# Patient Record
Sex: FEMALE | NOT HISPANIC OR LATINO | Employment: UNEMPLOYED | URBAN - METROPOLITAN AREA
[De-identification: names, ages, dates, MRNs, and addresses within clinical notes are randomized per-mention and may not be internally consistent; named-entity substitution may affect disease eponyms.]

---

## 2023-05-04 ENCOUNTER — HOSPITAL ENCOUNTER (OUTPATIENT)
Dept: DATA CONVERSION | Facility: HOSPITAL | Age: 43
End: 2023-05-04

## 2023-11-01 ENCOUNTER — HOSPITAL ENCOUNTER (EMERGENCY)
Facility: HOSPITAL | Age: 43
Discharge: OTHER INSTITUTION | End: 2023-11-04
Attending: EMERGENCY MEDICINE
Payer: COMMERCIAL

## 2023-11-01 DIAGNOSIS — R45.851 SUICIDAL IDEATION: ICD-10-CM

## 2023-11-01 DIAGNOSIS — F29 PSYCHOSIS, UNSPECIFIED PSYCHOSIS TYPE (MULTI): Primary | ICD-10-CM

## 2023-11-01 DIAGNOSIS — F10.920 ALCOHOLIC INTOXICATION WITHOUT COMPLICATION (CMS-HCC): ICD-10-CM

## 2023-11-01 PROCEDURE — 85025 COMPLETE CBC W/AUTO DIFF WBC: CPT | Performed by: STUDENT IN AN ORGANIZED HEALTH CARE EDUCATION/TRAINING PROGRAM

## 2023-11-01 PROCEDURE — 80329 ANALGESICS NON-OPIOID 1 OR 2: CPT | Mod: MUE | Performed by: STUDENT IN AN ORGANIZED HEALTH CARE EDUCATION/TRAINING PROGRAM

## 2023-11-01 PROCEDURE — 80053 COMPREHEN METABOLIC PANEL: CPT | Performed by: STUDENT IN AN ORGANIZED HEALTH CARE EDUCATION/TRAINING PROGRAM

## 2023-11-01 PROCEDURE — 36415 COLL VENOUS BLD VENIPUNCTURE: CPT | Performed by: STUDENT IN AN ORGANIZED HEALTH CARE EDUCATION/TRAINING PROGRAM

## 2023-11-01 PROCEDURE — 81025 URINE PREGNANCY TEST: CPT | Performed by: STUDENT IN AN ORGANIZED HEALTH CARE EDUCATION/TRAINING PROGRAM

## 2023-11-01 PROCEDURE — 82550 ASSAY OF CK (CPK): CPT | Performed by: EMERGENCY MEDICINE

## 2023-11-01 PROCEDURE — 99285 EMERGENCY DEPT VISIT HI MDM: CPT | Performed by: EMERGENCY MEDICINE

## 2023-11-01 RX ORDER — FENTANYL CITRATE 50 UG/ML
INJECTION, SOLUTION INTRAMUSCULAR; INTRAVENOUS
Status: DISPENSED
Start: 2023-11-01 | End: 2023-11-02

## 2023-11-01 ASSESSMENT — LIFESTYLE VARIABLES
HAVE PEOPLE ANNOYED YOU BY CRITICIZING YOUR DRINKING: NO
EVER FELT BAD OR GUILTY ABOUT YOUR DRINKING: NO
REASON UNABLE TO ASSESS: NO
EVER HAD A DRINK FIRST THING IN THE MORNING TO STEADY YOUR NERVES TO GET RID OF A HANGOVER: NO
HAVE YOU EVER FELT YOU SHOULD CUT DOWN ON YOUR DRINKING: NO

## 2023-11-01 ASSESSMENT — COLUMBIA-SUICIDE SEVERITY RATING SCALE - C-SSRS
6. HAVE YOU EVER DONE ANYTHING, STARTED TO DO ANYTHING, OR PREPARED TO DO ANYTHING TO END YOUR LIFE?: NO
2. HAVE YOU ACTUALLY HAD ANY THOUGHTS OF KILLING YOURSELF?: NO
1. IN THE PAST MONTH, HAVE YOU WISHED YOU WERE DEAD OR WISHED YOU COULD GO TO SLEEP AND NOT WAKE UP?: NO

## 2023-11-01 ASSESSMENT — PAIN SCALES - GENERAL: PAINLEVEL_OUTOF10: 0 - NO PAIN

## 2023-11-01 ASSESSMENT — PAIN - FUNCTIONAL ASSESSMENT: PAIN_FUNCTIONAL_ASSESSMENT: 0-10

## 2023-11-01 NOTE — ED TRIAGE NOTES
Patient presents to the Emergency department with a chief complaint of alcohol intoxication. Per ECFD, patient was found on the ground stating she was drunk. Patient initially refused treatment and then proceeded to call 911 after they left. Patient became uncooperative with ECFD. Upon arrival to [facility] ED, patient endorses drinking an unknown amount of alcohol prior to arrival. Patient denies any medical complaints at this time. Patient has no outward signs of trauma. Patient requesting food and a ginger ale upon arrival. Patient denies any suicidal ideation, homicidal ideation, but says she might be hearing voices. Patient unwilling to answer any further questions.

## 2023-11-01 NOTE — ED PROVIDER NOTES
CC: Alcohol Intoxication     HPI:  This is a 43-year-old female presenting to the ED via PD for intoxication.  Patient was found sitting on the ground stating she was drunk.  Per report patient initially refused treatment and then proceeded to call 911 after they left.  States that she has been drinking all day.  Denies any other substance use.  Denies any trauma.  States she thinks she has a UTI.      Limitations to History: Intoxication    Additional History Obtained from: EMS    Records Reviewed:  Recent available ED and inpatient notes reviewed in EMR.    PMHx/PSHx:  Per HPI.   - has no past medical history on file.  - has no past surgical history on file.    Medications:  Reviewed in EMR. See EMR for complete list of medications and doses.    Allergies:  Patient has no allergy information on record.    Social History:  - Tobacco:  has no history on file for tobacco use.   - Alcohol:  has no history on file for alcohol use.   - Illicit Drugs:  has no history on file for drug use.     ROS:  Per HPI.     ???????????????????????????????????????????????????????????????  Triage Vitals:  T 36.7 °C (98.1 °F)  HR 88  BP (!) 168/97  RR 16  O2 99 %      Physical Exam  Vitals and nursing note reviewed.   Constitutional:       General: She is not in acute distress.     Appearance: She is not toxic-appearing.   HENT:      Head: Normocephalic and atraumatic.      Mouth/Throat:      Mouth: Mucous membranes are moist.   Eyes:      General: No scleral icterus.     Conjunctiva/sclera: Conjunctivae normal.   Cardiovascular:      Rate and Rhythm: Normal rate and regular rhythm.   Pulmonary:      Effort: Pulmonary effort is normal. No respiratory distress.   Abdominal:      General: Abdomen is flat.      Palpations: Abdomen is soft.   Skin:     General: Skin is warm.   Neurological:      General: No focal deficit present.      Mental Status: She is alert and oriented to person, place, and time.   Psychiatric:         Mood and  Affect: Mood normal.      Comments: Intermittently non-cooperative     :  ???????????????????????????????????????????????????????????????      ED Labs/Imaging:   Labs Reviewed   URINALYSIS WITH REFLEX MICROSCOPIC AND CULTURE   POCT PREGNANCY, URINE   POCT GLUCOSE METER     No orders to display         ED Course & MDM   ED Course as of 11/03/23 1813   Thu Nov 02, 2023   0723 Patient is medically clear at 0 723. [BN]   Fri Nov 03, 2023   1132 Patient with urinary frequency, nitrite + and small LE; will treat as UTI given symptoms [JV]      ED Course User Index  [BN] Missael Rogers MD  [JV] Cristóbal Wright MD         Diagnoses as of 11/03/23 1813   Psychosis, unspecified psychosis type (CMS/HCC)   Alcoholic intoxication without complication (CMS/HCC)   Suicidal ideation       Medical Decision Making  This is a 43-year-old female presenting to the ED for intoxication.  Patient arrives hemodynamically stable and not in acute distress.  Patient intermittently cooperative but is conversing and has no focal deficits.  Urinalysis obtained as well as urine pregnancy test given patient's complaints that she think she has a UTI.  On reevaluation patient stating that she actually drinks to calm the voices she has been having in her head.  Given this psychiatric work-up including basic labs were initiated and EPAT was consulted.  Patient will be handed off to oncoming team pending urinalysis results and EPAT evaluation and final disposition.      Social Determinants Limiting Care:  None identified    Disposition:  Handoff, final dispo pending    Patient seen and discussed with attending physician.    Glenys Gray MD PGY3  Emergency Medicine      Procedures ? SmartLinks last updated 11/1/2023 7:16 PM          Glenys Gray MD  Resident  11/03/23 2780

## 2023-11-02 LAB
ALBUMIN SERPL BCP-MCNC: 3.5 G/DL (ref 3.4–5)
ALP SERPL-CCNC: 98 U/L (ref 33–110)
ALT SERPL W P-5'-P-CCNC: 26 U/L (ref 7–45)
AMPHETAMINES UR QL SCN: ABNORMAL
ANION GAP SERPL CALC-SCNC: 15 MMOL/L (ref 10–20)
APAP SERPL-MCNC: <10 UG/ML
APPEARANCE UR: ABNORMAL
AST SERPL W P-5'-P-CCNC: 34 U/L (ref 9–39)
BARBITURATES UR QL SCN: ABNORMAL
BASOPHILS # BLD AUTO: 0.05 X10*3/UL (ref 0–0.1)
BASOPHILS NFR BLD AUTO: 0.5 %
BENZODIAZ UR QL SCN: ABNORMAL
BILIRUB SERPL-MCNC: 0.3 MG/DL (ref 0–1.2)
BILIRUB UR STRIP.AUTO-MCNC: NEGATIVE MG/DL
BUN SERPL-MCNC: 4 MG/DL (ref 6–23)
BZE UR QL SCN: ABNORMAL
CALCIUM SERPL-MCNC: 9.2 MG/DL (ref 8.6–10.6)
CANNABINOIDS UR QL SCN: ABNORMAL
CHLORIDE SERPL-SCNC: 107 MMOL/L (ref 98–107)
CK SERPL-CCNC: <10 U/L (ref 0–215)
CO2 SERPL-SCNC: 25 MMOL/L (ref 21–32)
COLOR UR: YELLOW
CREAT SERPL-MCNC: 0.53 MG/DL (ref 0.5–1.05)
EOSINOPHIL # BLD AUTO: 0.02 X10*3/UL (ref 0–0.7)
EOSINOPHIL NFR BLD AUTO: 0.2 %
ERYTHROCYTE [DISTWIDTH] IN BLOOD BY AUTOMATED COUNT: 17 % (ref 11.5–14.5)
ETHANOL SERPL-MCNC: 10 MG/DL
FENTANYL+NORFENTANYL UR QL SCN: ABNORMAL
GFR SERPL CREATININE-BSD FRML MDRD: >90 ML/MIN/1.73M*2
GLUCOSE SERPL-MCNC: 122 MG/DL (ref 74–99)
GLUCOSE UR STRIP.AUTO-MCNC: NEGATIVE MG/DL
HCT VFR BLD AUTO: 35.4 % (ref 36–46)
HGB BLD-MCNC: 12.6 G/DL (ref 12–16)
HOLD SPECIMEN: NORMAL
IMM GRANULOCYTES # BLD AUTO: 0.05 X10*3/UL (ref 0–0.7)
IMM GRANULOCYTES NFR BLD AUTO: 0.5 % (ref 0–0.9)
KETONES UR STRIP.AUTO-MCNC: ABNORMAL MG/DL
LEUKOCYTE ESTERASE UR QL STRIP.AUTO: ABNORMAL
LYMPHOCYTES # BLD AUTO: 1.85 X10*3/UL (ref 1.2–4.8)
LYMPHOCYTES NFR BLD AUTO: 17.1 %
MCH RBC QN AUTO: 32.7 PG (ref 26–34)
MCHC RBC AUTO-ENTMCNC: 35.6 G/DL (ref 32–36)
MCV RBC AUTO: 92 FL (ref 80–100)
MONOCYTES # BLD AUTO: 1.04 X10*3/UL (ref 0.1–1)
MONOCYTES NFR BLD AUTO: 9.6 %
MUCOUS THREADS #/AREA URNS AUTO: NORMAL /LPF
NEUTROPHILS # BLD AUTO: 7.79 X10*3/UL (ref 1.2–7.7)
NEUTROPHILS NFR BLD AUTO: 72.1 %
NITRITE UR QL STRIP.AUTO: POSITIVE
NRBC BLD-RTO: 0 /100 WBCS (ref 0–0)
OPIATES UR QL SCN: ABNORMAL
OXYCODONE+OXYMORPHONE UR QL SCN: ABNORMAL
PCP UR QL SCN: ABNORMAL
PH UR STRIP.AUTO: 6 [PH]
PLATELET # BLD AUTO: 283 X10*3/UL (ref 150–450)
POTASSIUM SERPL-SCNC: 3.8 MMOL/L (ref 3.5–5.3)
PREGNANCY TEST URINE, POC: NEGATIVE
PROT SERPL-MCNC: 6.9 G/DL (ref 6.4–8.2)
PROT UR STRIP.AUTO-MCNC: NEGATIVE MG/DL
RBC # BLD AUTO: 3.85 X10*6/UL (ref 4–5.2)
RBC # UR STRIP.AUTO: ABNORMAL /UL
RBC #/AREA URNS AUTO: NORMAL /HPF
SALICYLATES SERPL-MCNC: <3 MG/DL
SARS-COV-2 RNA RESP QL NAA+PROBE: NOT DETECTED
SODIUM SERPL-SCNC: 143 MMOL/L (ref 136–145)
SP GR UR STRIP.AUTO: 1.01
SQUAMOUS #/AREA URNS AUTO: NORMAL /HPF
UROBILINOGEN UR STRIP.AUTO-MCNC: 2 MG/DL
WBC # BLD AUTO: 10.8 X10*3/UL (ref 4.4–11.3)
WBC #/AREA URNS AUTO: NORMAL /HPF

## 2023-11-02 PROCEDURE — 87086 URINE CULTURE/COLONY COUNT: CPT | Performed by: STUDENT IN AN ORGANIZED HEALTH CARE EDUCATION/TRAINING PROGRAM

## 2023-11-02 PROCEDURE — 80307 DRUG TEST PRSMV CHEM ANLYZR: CPT | Performed by: STUDENT IN AN ORGANIZED HEALTH CARE EDUCATION/TRAINING PROGRAM

## 2023-11-02 PROCEDURE — 81001 URINALYSIS AUTO W/SCOPE: CPT | Mod: CCI | Performed by: STUDENT IN AN ORGANIZED HEALTH CARE EDUCATION/TRAINING PROGRAM

## 2023-11-02 PROCEDURE — 87635 SARS-COV-2 COVID-19 AMP PRB: CPT | Performed by: EMERGENCY MEDICINE

## 2023-11-02 RX ORDER — NAPROXEN SODIUM 220 MG/1
324 TABLET, FILM COATED ORAL ONCE
Status: DISCONTINUED | OUTPATIENT
Start: 2023-11-02 | End: 2023-11-02

## 2023-11-02 SDOH — ECONOMIC STABILITY: HOUSING INSECURITY: FEELS SAFE LIVING IN HOME: NO

## 2023-11-02 SDOH — HEALTH STABILITY: MENTAL HEALTH: DEPRESSION SYMPTOMS: CRYING;FEELINGS OF HELPLESSNESS;FEELINGS OF HOPELESSESS

## 2023-11-02 SDOH — HEALTH STABILITY: MENTAL HEALTH: NON-SPECIFIC ACTIVE SUICIDAL THOUGHTS (PAST 1 MONTH): NO

## 2023-11-02 SDOH — HEALTH STABILITY: MENTAL HEALTH: SUICIDAL BEHAVIOR (LIFETIME): NO

## 2023-11-02 SDOH — HEALTH STABILITY: MENTAL HEALTH: ARE YOU HAVING THOUGHTS OF KILLING YOURSELF RIGHT NOW?: NO

## 2023-11-02 SDOH — HEALTH STABILITY: MENTAL HEALTH: BEHAVIORS/MOOD: SLEEPING

## 2023-11-02 SDOH — HEALTH STABILITY: MENTAL HEALTH: IN THE PAST FEW WEEKS, HAVE YOU WISHED YOU WERE DEAD?: YES

## 2023-11-02 SDOH — HEALTH STABILITY: MENTAL HEALTH: WISH TO BE DEAD (PAST 1 MONTH): YES

## 2023-11-02 SDOH — HEALTH STABILITY: MENTAL HEALTH: HAVE YOU EVER TRIED TO KILL YOURSELF?: NO

## 2023-11-02 SDOH — HEALTH STABILITY: MENTAL HEALTH: IN THE PAST WEEK, HAVE YOU BEEN HAVING THOUGHTS ABOUT KILLING YOURSELF?: NO

## 2023-11-02 SDOH — HEALTH STABILITY: MENTAL HEALTH: IN THE PAST FEW WEEKS, HAVE YOU FELT THAT YOU OR YOUR FAMILY WOULD BE BETTER OFF IF YOU WERE DEAD?: YES

## 2023-11-02 SDOH — HEALTH STABILITY: MENTAL HEALTH: ANXIETY SYMPTOMS: GENERALIZED

## 2023-11-02 ASSESSMENT — LIFESTYLE VARIABLES
TREMOR: NO TREMOR
NAUSEA AND VOMITING: NO NAUSEA AND NO VOMITING
PRESCIPTION_ABUSE_PAST_12_MONTHS: NO
SUBSTANCE_ABUSE_PAST_12_MONTHS: YES
ANXIETY: NO ANXIETY, AT EASE
HEADACHE, FULLNESS IN HEAD: NOT PRESENT
ORIENTATION AND CLOUDING OF SENSORIUM: ORIENTED AND CAN DO SERIAL ADDITIONS
TOTAL SCORE: 0
PAROXYSMAL SWEATS: NO SWEAT VISIBLE
AGITATION: NORMAL ACTIVITY
VISUAL DISTURBANCES: NOT PRESENT
AUDITORY DISTURBANCES: NOT PRESENT
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
## 2023-11-02 NOTE — PROGRESS NOTES
Patient signed out to me pending psychiatric evaluation.  Patient was medically clear at the time of signout.  While under my care psychiatric assessment was completed and was determined the patient required inpatient psychiatric admission for hallucinations and psychotic behavior.  Patient did not have any SI/HI on evaluation.  No additional medical concerns at this time.  Patient signed out in stable condition pending placement by EPAT.    Patient seen and discussed with attending physician    Missael Rogers M.D.  PGY-3 EM

## 2023-11-03 PROBLEM — R45.851 SUICIDAL IDEATION: Status: ACTIVE | Noted: 2023-11-03

## 2023-11-03 LAB — PREGNANCY TEST URINE, POC: NEGATIVE

## 2023-11-03 PROCEDURE — 99222 1ST HOSP IP/OBS MODERATE 55: CPT

## 2023-11-03 PROCEDURE — 2500000001 HC RX 250 WO HCPCS SELF ADMINISTERED DRUGS (ALT 637 FOR MEDICARE OP): Mod: SE | Performed by: STUDENT IN AN ORGANIZED HEALTH CARE EDUCATION/TRAINING PROGRAM

## 2023-11-03 RX ORDER — CEPHALEXIN 250 MG/1
500 CAPSULE ORAL ONCE
Status: DISCONTINUED | OUTPATIENT
Start: 2023-11-03 | End: 2023-11-03

## 2023-11-03 RX ORDER — CEPHALEXIN 250 MG/1
500 CAPSULE ORAL EVERY 6 HOURS SCHEDULED
Status: DISCONTINUED | OUTPATIENT
Start: 2023-11-03 | End: 2023-11-04 | Stop reason: HOSPADM

## 2023-11-03 RX ADMIN — CEPHALEXIN 500 MG: 250 CAPSULE ORAL at 18:29

## 2023-11-03 ASSESSMENT — LIFESTYLE VARIABLES
ANXIETY: NO ANXIETY, AT EASE
TREMOR: NO TREMOR
VISUAL DISTURBANCES: NOT PRESENT
AGITATION: NORMAL ACTIVITY
AUDITORY DISTURBANCES: NOT PRESENT
HEADACHE, FULLNESS IN HEAD: NOT PRESENT
PAROXYSMAL SWEATS: NO SWEAT VISIBLE
NAUSEA AND VOMITING: NO NAUSEA AND NO VOMITING
TOTAL SCORE: 0
ORIENTATION AND CLOUDING OF SENSORIUM: ORIENTED AND CAN DO SERIAL ADDITIONS

## 2023-11-03 ASSESSMENT — PAIN SCALES - GENERAL
PAINLEVEL_OUTOF10: 0 - NO PAIN
PAINLEVEL_OUTOF10: 0 - NO PAIN

## 2023-11-03 ASSESSMENT — COLUMBIA-SUICIDE SEVERITY RATING SCALE - C-SSRS
1. SINCE LAST CONTACT, HAVE YOU WISHED YOU WERE DEAD OR WISHED YOU COULD GO TO SLEEP AND NOT WAKE UP?: NO
6. HAVE YOU EVER DONE ANYTHING, STARTED TO DO ANYTHING, OR PREPARED TO DO ANYTHING TO END YOUR LIFE?: NO
2. HAVE YOU ACTUALLY HAD ANY THOUGHTS OF KILLING YOURSELF?: NO

## 2023-11-03 NOTE — PROGRESS NOTES
Patient is a 43-year-old female with initial concern for alcohol intoxication, upon metabolization of alcohol provider was concerned for AVH and suicidal ideation.  Evaluated by EPAT after medically cleared, who recommended inpatient admission for acute crisis stabilization.  Signed out to me pending bed placement.  EPAT was able to place the patient at [facility] with Dr. Santiago, all paperwork was completed, patient would benefit from inpatient admission, no decompensation or anxiolysis needed during my time caring for the patient, admitted in stable condition.

## 2023-11-03 NOTE — PROGRESS NOTES
Patient was handed off to me by Dr. Rogers at 0700. For full history, physical, and prior ED course, please see previous provider note prior to patient handoff. This is an addendum to the record.     HOSPITAL COURSE/MEDICAL DECISION MAKING  In short, this is a 43-year-old female presenting to the emergency department for acute alcohol intoxication.  Once patient had achieved sobriety, she was reporting SI.  EPAT was involved who determined that placement will be beneficial to the patient.  Signed out to me by previous provider pending location for EPAT placement. Throughout the ED stay, the patient was monitored and re-examined for any changes in stability or symptomatology.     ED Course as of 11/03/23 1100   Thu Nov 02, 2023   0723 Patient is medically clear at 0 723. [BN]      ED Course User Index  [BN] Missael Rogers MD         Diagnoses as of 11/03/23 1100   Psychosis, unspecified psychosis type (CMS/HCC)   Alcoholic intoxication without complication (CMS/HCC)         DIAGNOSIS  1.  SI    DISPOSITION  EPAT to place     I reviewed the patient´s case with Dr. Oviedo who also saw the patient and agrees with the plan. The diagnosis and plan of care was also discussed with the patient. All the patient's questions were answered. The patient was receptive and agreeable to the plan of care.     Cristóbal Wright MD  Emergency Medicine PGY-3    This note was dictated using dragon dictation.  Please excuse any errors found in the note.

## 2023-11-03 NOTE — SIGNIFICANT EVENT
Application for Emergency Admission      Ready for Transfer?  Is the patient medically cleared for transfer to inpatient psychiatry: Yes  Has the patient been accepted to an inpatient psychiatric hospital: Yes    Application for Emergency Admission  IN ACCORDANCE WITH [place]122[place]10 [place]  The Chief Clinical Officer of: [facility] 11/3/2023 .5:03 PM    Reason for Hospitalization  The undersigned has reason to believe that: Mary Kate Arias Is a mentally ill person subject to hospitalization by court order under division B Section 5122.01 of the Revised Code, i.e., this person:    1.Yes  Represents a substantial risk of physical harm to self as manifested by evidence of threats of, or attempts at, suicide or serious self-inflicted bodily harm    2.No Represents a substantial risk of physical harm to others as manifested by evidence of recent homicidal or other violent behavior, evidence of recent threats that place another in reasonable fear of violent behavior and serious physical harm, or other evidence of present dangerousness    3.Yes Represents a substantial and immediate risk of serious physical impairment or injury to self as manifested by  evidence that the person is unable to provide for and is not providing for the person's basic physical needs because of the person's mental illness and that appropriate provision for those needs cannot be made  immediately available in the community    4.Yes Would benefit from treatment in a hospital for his mental illness and is in need of such treatment as manifested by evidence of behavior that creates a grave and imminent risk to substantial rights of others or  himself.    5.Yes Would benefit from treatment as manifested by evidence of behavior that indicates all of the following:       (a) The person is unlikely to survive safely in the community without supervision, based on a clinical determination.       (b) The person has a history of lack of  compliance with treatment for mental illness and one of the following applies:      (i) At least twice within the thirty-six months prior to the filing of an affidavit seeking court-ordered treatment of the person under section 5122.111 of the Revised Code, the lack of compliance has been a significant factor in necessitating hospitalization in a hospital or receipt of services in a forensic or other mental health unit of a correctional facility, provided that the thirty-six-month period shall be extended by the length of any hospitalization or incarceration of the person that occurred within the thirty-six-month period.      (ii) Within the forty-eight months prior to the filing of an affidavit seeking court-ordered treatment of the person under section 5122.111 of the Revised Code, the lack of compliance resulted in one or more acts of serious violent behavior toward self or others or threats of, or attempts at, serious physical harm to self or others, provided that the forty-eight-month period shall be extended by the length of any hospitalization or incarceration of the person that occurred within the forty-eight-month period.      (c) The person, as a result of mental illness, is unlikely to voluntarily participate in necessary treatment.       (d) In view of the person's treatment history and current behavior, the person is in need of treatment in order to prevent a relapse or deterioration that would be likely to result in substantial risk of serious harm to the person or others.    (e) Represents a substantial risk of physical harm to self or others if allowed to remain at liberty pending examination.    Therefore, it is requested that said person be admitted to the above named facility.    STATEMENT OF BELIEF    Must be filled out by one of the following: a psychiatrist, licensed physician, licensed clinical psychologist, health or ,  or .  (Statement shall include the  circumstances under which the individual was taken into custody and the reason for the person's belief that hospitalization is necessary. The statement shall also include a reference to efforts made to secure the individual's property at his residence if he was taken into custody there. Every reasonable and appropriate effort should be made to take this person into custody in the least conspicuous manner possible.)    Based on concern for acute psychosis concern for suicidal ideation, patient will require inpatient admission for acute crisis stabilization as I believe her to be at an increased risk of harm to herself.    Matthew Ghosh MD 11/3/2023     _____________________________________________________________   Place of Employment: [facility]     STATEMENT OF OBSERVATION BY PSYCHIATRIST, LICENSED PHYSICIAN, OR LICENSED CLINICAL PSYCHOLOGIST, IF APPLICABLE    Place of Observation (e.g., Critical access hospital mental health center, general hospital, office, emergency facility)  (If applicable, please complete)    Matthew Ghosh MD 11/3/2023    _____________________________________________________________

## 2023-11-03 NOTE — CONSULTS
"Referring Provider  Jourdan Veliz    History Of Present Illness  Mary Kate Arias is a 43 y.o. female presenting with to [facility] ED via EMS on 11/1/23 with c/c of intoxication. Pt initially seen by psychiatric social work & awaiting inpatient psychiatric bed availability.      Past Medical History  She has no past medical history on file.    Surgical History  She has no past surgical history on file.     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     Allergies  Patient has no known allergies.    Review of Systems    Psychiatric ROS - Adult  Anxiety: Negative  Depression: negative  Delirium: negative  Psychosis: negative  Lindy: negative  Safety Issues: none      Physical Exam      Mental Status Exam  General: 42 y/o AA female, in ED attire resting in ED cot, in NAD  Appearance: appears older than stated age  Attitude: calm, cooperative  Behavior: fair eye contact  Motor Activity: no PMR/PMA, no TD/EPS. Gait not assessed.   Speech: appropriate R/R/V/T, spontaneous, fluent  Mood: \"better\"  Affect: flat  Thought Process: linear, goal directed  Thought Content: denies SI/HI or delusions  Thought Perception: no AVH, does not appear to be RTIS  Cognition: alert, oriented x 3  Insight: limited, help seeking  Judgement: impaired    Psychiatric Risk Assessment  Violence Risk Assessment: lower socioeconomic class, substance abuse, and unemployment  Acute Risk of Harm to Others is Considered: low   Suicide Risk Assessment: life crisis (shame/despair), living alone or lack of social support, substance abuse, and unmarried  Protective Factors against Suicide: hopefulness/future orientation and positive family relationships  Acute Risk of Harm to Self is Considered: low    Last Recorded Vitals  Blood pressure 131/88, pulse 66, temperature 36 °C (96.8 °F), resp. rate 14, height 1.727 m (5' 8\"), weight 109 kg (240 lb), SpO2 100 %.    Relevant " Results  Scheduled medications  cephalexin, 500 mg, oral, Once  cephalexin, 500 mg, oral, q6h BEATRIZ      Continuous medications     PRN medications  Results for orders placed or performed during the hospital encounter of 11/01/23 (from the past 24 hour(s))   POCT pregnancy, urine   Result Value Ref Range    Preg Test, Ur Negative Negative     Results for orders placed or performed during the hospital encounter of 11/01/23 (from the past 96 hour(s))   CBC and Auto Differential   Result Value Ref Range    WBC 10.8 4.4 - 11.3 x10*3/uL    nRBC 0.0 0.0 - 0.0 /100 WBCs    RBC 3.85 (L) 4.00 - 5.20 x10*6/uL    Hemoglobin 12.6 12.0 - 16.0 g/dL    Hematocrit 35.4 (L) 36.0 - 46.0 %    MCV 92 80 - 100 fL    MCH 32.7 26.0 - 34.0 pg    MCHC 35.6 32.0 - 36.0 g/dL    RDW 17.0 (H) 11.5 - 14.5 %    Platelets 283 150 - 450 x10*3/uL    Neutrophils % 72.1 40.0 - 80.0 %    Immature Granulocytes %, Automated 0.5 0.0 - 0.9 %    Lymphocytes % 17.1 13.0 - 44.0 %    Monocytes % 9.6 2.0 - 10.0 %    Eosinophils % 0.2 0.0 - 6.0 %    Basophils % 0.5 0.0 - 2.0 %    Neutrophils Absolute 7.79 (H) 1.20 - 7.70 x10*3/uL    Immature Granulocytes Absolute, Automated 0.05 0.00 - 0.70 x10*3/uL    Lymphocytes Absolute 1.85 1.20 - 4.80 x10*3/uL    Monocytes Absolute 1.04 (H) 0.10 - 1.00 x10*3/uL    Eosinophils Absolute 0.02 0.00 - 0.70 x10*3/uL    Basophils Absolute 0.05 0.00 - 0.10 x10*3/uL   Comprehensive Metabolic Panel   Result Value Ref Range    Glucose 122 (H) 74 - 99 mg/dL    Sodium 143 136 - 145 mmol/L    Potassium 3.8 3.5 - 5.3 mmol/L    Chloride 107 98 - 107 mmol/L    Bicarbonate 25 21 - 32 mmol/L    Anion Gap 15 10 - 20 mmol/L    Urea Nitrogen 4 (L) 6 - 23 mg/dL    Creatinine 0.53 0.50 - 1.05 mg/dL    eGFR >90 >60 mL/min/1.73m*2    Calcium 9.2 8.6 - 10.6 mg/dL    Albumin 3.5 3.4 - 5.0 g/dL    Alkaline Phosphatase 98 33 - 110 U/L    Total Protein 6.9 6.4 - 8.2 g/dL    AST 34 9 - 39 U/L    Bilirubin, Total 0.3 0.0 - 1.2 mg/dL    ALT 26 7 - 45 U/L    Acute Toxicology Panel, Blood   Result Value Ref Range    Acetaminophen <10.0 10.0 - 30.0 ug/mL    Salicylate  <3 4 - 20 mg/dL    Alcohol 10 <=10 mg/dL   Creatine Kinase   Result Value Ref Range    Creatine Kinase <10 0 - 215 U/L   Drug Screen, Urine   Result Value Ref Range    Amphetamine Screen, Urine Presumptive Negative Presumptive Negative    Barbiturate Screen, Urine Presumptive Negative Presumptive Negative    Benzodiazepines Screen, Urine Presumptive Negative Presumptive Negative    Cannabinoid Screen, Urine Presumptive Positive (A) Presumptive Negative    Cocaine Metabolite Screen, Urine Presumptive Positive (A) Presumptive Negative    Fentanyl Screen, Urine Presumptive Negative Presumptive Negative    Opiate Screen, Urine Presumptive Negative Presumptive Negative    Oxycodone Screen, Urine Presumptive Negative Presumptive Negative    PCP Screen, Urine Presumptive Positive (A) Presumptive Negative   Urinalysis with Reflex Microscopic and Culture   Result Value Ref Range    Color, Urine Yellow Straw, Yellow    Appearance, Urine Hazy (N) Clear    Specific Gravity, Urine 1.009 1.005 - 1.035    pH, Urine 6.0 5.0, 5.5, 6.0, 6.5, 7.0, 7.5, 8.0    Protein, Urine NEGATIVE NEGATIVE mg/dL    Glucose, Urine NEGATIVE NEGATIVE mg/dL    Blood, Urine SMALL (1+) (A) NEGATIVE    Ketones, Urine 5 (TRACE) (A) NEGATIVE mg/dL    Bilirubin, Urine NEGATIVE NEGATIVE    Urobilinogen, Urine 2.0 (N) <2.0 mg/dL    Nitrite, Urine POSITIVE (A) NEGATIVE    Leukocyte Esterase, Urine SMALL (1+) (A) NEGATIVE   Microscopic Only, Urine   Result Value Ref Range    WBC, Urine 1-5 1-5, NONE /HPF    RBC, Urine NONE NONE, 1-2, 3-5 /HPF    Squamous Epithelial Cells, Urine 1-9 (SPARSE) Reference range not established. /HPF    Mucus, Urine 1+ Reference range not established. /LPF   Urine Culture    Specimen: Clean Catch/Voided; Urine   Result Value Ref Range    Urine Culture >100,000 Gram Negative Bacilli (A)    Extra Urine Gray Tube   Result Value  "Ref Range    Extra Tube Hold for add-ons.    Sars-CoV-2 PCR, Screen Asymptomatic   Result Value Ref Range    Coronavirus 2019, PCR Not Detected Not Detected   POCT pregnancy, urine   Result Value Ref Range    Preg Test, Ur Negative Negative   POCT pregnancy, urine   Result Value Ref Range    Preg Test, Ur Negative Negative            Assessment/Plan     The pt is easily awakened for assessment. She reports 1 month of daily PCP, crack cocaine, cannabis & alcohol use after being sober for 3 years. She reports the AVH are present when she is sober, and she 'self medicates with the drugs since no meds have worked before.' She denies any AVH over the past 2 days since being in the ED. When present, the VH are of 'goblins, rats, demons' and the AH tell her 'you're going to die!' She is flat and upset that she broke her sobriety. She has been in contact with her mom since being in ED, and voices she has her mother's support to become clean. She is hopeful to get on psychiatric medication to alleviate the hallucinations. Also requesting ED staff to look at her UA result as she has frequent urination, and thinks she has a UTI.     Impression  Unspecified psychosis     R/o substance induced psychosis  Unspecified mood disorder    RECOMMENDATIONS  - patient DOES currently meet criteria for inpatient psychiatric hospitalization; EPAT working on placement  - Issue Application for Emergency Admission (pink slip) only after patient is accepted to an inpatient psychiatric unit and is ready to be discharged. Search “Application for Emergency Admission” under SmartText.”  - Patient lacks the capacity to leave AMA at this time and thus cannot leave AMA. Call [facility] if patient attempts to leave AMA.  - To evaluate decision-making capacity, recommend use of the Capacity Evaluation Tool. Search “ IP Capacity Evaluation under SmartText\" unless the patient has a legal guardian, in which case   - Would secure all personal possessions " and keep clad in hospital gown    Reviewed above plan with current ED provider, Dr. Cristóbal Titus.     I spent 60 minutes in the professional and overall care of this patient.      Medication Consent  Medication Consent: n/a; consult service    Gissel Bishop, APRN-CNP

## 2023-11-04 VITALS
RESPIRATION RATE: 20 BRPM | SYSTOLIC BLOOD PRESSURE: 131 MMHG | BODY MASS INDEX: 36.37 KG/M2 | TEMPERATURE: 98.3 F | WEIGHT: 240 LBS | OXYGEN SATURATION: 98 % | DIASTOLIC BLOOD PRESSURE: 81 MMHG | HEART RATE: 68 BPM | HEIGHT: 68 IN

## 2023-11-04 LAB — BACTERIA UR CULT: ABNORMAL

## 2023-11-04 PROCEDURE — 2500000001 HC RX 250 WO HCPCS SELF ADMINISTERED DRUGS (ALT 637 FOR MEDICARE OP): Mod: SE | Performed by: STUDENT IN AN ORGANIZED HEALTH CARE EDUCATION/TRAINING PROGRAM

## 2023-11-04 RX ADMIN — CEPHALEXIN 500 MG: 250 CAPSULE ORAL at 00:00

## 2023-11-05 ENCOUNTER — TELEPHONE (OUTPATIENT)
Dept: PHARMACY | Facility: HOSPITAL | Age: 43
End: 2023-11-05
Payer: COMMERCIAL

## 2023-11-05 NOTE — PROGRESS NOTES
EDPD Note: Lab/Chart Reviewed    Reviewed Ms. Mary Kate Arias 's chart regarding a positive urine culture for E. Coli that was taken during their recent emergency room visit. The patient was transferred to a non- facility .Therefore, I have faxed this information to [facility] (phone [phone]) at fax number [phone] .    Susceptibility data from last 90 days.  Collected Specimen Info Organism Ampicillin Cefazolin Cefazolin (uncomplicated UTIs only) Ciprofloxacin Gentamicin Nitrofurantoin Piperacillin/Tazobactam Trimethoprim/Sulfamethoxazole   11/02/23 Urine from Clean Catch/Voided Escherichia coli S S S S S S S S       No further follow up needed from EDPD Team.     Ana Paula Cohen, PharmD

## 2024-05-16 ENCOUNTER — HOSPITAL ENCOUNTER (EMERGENCY)
Facility: HOSPITAL | Age: 44
Discharge: OTHER INSTITUTION | End: 2024-05-18
Attending: EMERGENCY MEDICINE
Payer: COMMERCIAL

## 2024-05-16 ENCOUNTER — CLINICAL SUPPORT (OUTPATIENT)
Dept: EMERGENCY MEDICINE | Facility: HOSPITAL | Age: 44
End: 2024-05-16
Payer: COMMERCIAL

## 2024-05-16 DIAGNOSIS — R44.3 HALLUCINATIONS: Primary | ICD-10-CM

## 2024-05-16 LAB
ALBUMIN SERPL BCP-MCNC: 4 G/DL (ref 3.4–5)
ALP SERPL-CCNC: 82 U/L (ref 33–110)
ALT SERPL W P-5'-P-CCNC: 28 U/L (ref 7–45)
AMPHETAMINES UR QL SCN: ABNORMAL
ANION GAP SERPL CALC-SCNC: 18 MMOL/L (ref 10–20)
APAP SERPL-MCNC: <10 UG/ML
APPEARANCE UR: ABNORMAL
AST SERPL W P-5'-P-CCNC: 47 U/L (ref 9–39)
ATRIAL RATE: 102 BPM
BARBITURATES UR QL SCN: ABNORMAL
BASOPHILS # BLD AUTO: 0.08 X10*3/UL (ref 0–0.1)
BASOPHILS NFR BLD AUTO: 0.6 %
BENZODIAZ UR QL SCN: ABNORMAL
BILIRUB SERPL-MCNC: 0.3 MG/DL (ref 0–1.2)
BILIRUB UR STRIP.AUTO-MCNC: NEGATIVE MG/DL
BUN SERPL-MCNC: 5 MG/DL (ref 6–23)
BZE UR QL SCN: ABNORMAL
CALCIUM SERPL-MCNC: 9.6 MG/DL (ref 8.6–10.6)
CANNABINOIDS UR QL SCN: ABNORMAL
CHLORIDE SERPL-SCNC: 106 MMOL/L (ref 98–107)
CO2 SERPL-SCNC: 20 MMOL/L (ref 21–32)
COLOR UR: YELLOW
CREAT SERPL-MCNC: 0.6 MG/DL (ref 0.5–1.05)
EGFRCR SERPLBLD CKD-EPI 2021: >90 ML/MIN/1.73M*2
EOSINOPHIL # BLD AUTO: 0.05 X10*3/UL (ref 0–0.7)
EOSINOPHIL NFR BLD AUTO: 0.4 %
ERYTHROCYTE [DISTWIDTH] IN BLOOD BY AUTOMATED COUNT: 14.2 % (ref 11.5–14.5)
ETHANOL SERPL-MCNC: 155 MG/DL
FENTANYL+NORFENTANYL UR QL SCN: ABNORMAL
GLUCOSE SERPL-MCNC: 120 MG/DL (ref 74–99)
GLUCOSE UR STRIP.AUTO-MCNC: NORMAL MG/DL
HCG UR QL IA.RAPID: NEGATIVE
HCT VFR BLD AUTO: 33.7 % (ref 36–46)
HGB BLD-MCNC: 11.9 G/DL (ref 12–16)
IMM GRANULOCYTES # BLD AUTO: 0.07 X10*3/UL (ref 0–0.7)
IMM GRANULOCYTES NFR BLD AUTO: 0.6 % (ref 0–0.9)
KETONES UR STRIP.AUTO-MCNC: NEGATIVE MG/DL
LEUKOCYTE ESTERASE UR QL STRIP.AUTO: NEGATIVE
LYMPHOCYTES # BLD AUTO: 2.87 X10*3/UL (ref 1.2–4.8)
LYMPHOCYTES NFR BLD AUTO: 22.7 %
MCH RBC QN AUTO: 32.1 PG (ref 26–34)
MCHC RBC AUTO-ENTMCNC: 35.3 G/DL (ref 32–36)
MCV RBC AUTO: 91 FL (ref 80–100)
METHADONE UR QL SCN: ABNORMAL
MONOCYTES # BLD AUTO: 1 X10*3/UL (ref 0.1–1)
MONOCYTES NFR BLD AUTO: 7.9 %
MUCOUS THREADS #/AREA URNS AUTO: NORMAL /LPF
NEUTROPHILS # BLD AUTO: 8.58 X10*3/UL (ref 1.2–7.7)
NEUTROPHILS NFR BLD AUTO: 67.8 %
NITRITE UR QL STRIP.AUTO: NEGATIVE
NRBC BLD-RTO: 0 /100 WBCS (ref 0–0)
OPIATES UR QL SCN: ABNORMAL
OXYCODONE+OXYMORPHONE UR QL SCN: ABNORMAL
P AXIS: 64 DEGREES
P OFFSET: 199 MS
P ONSET: 146 MS
PCP UR QL SCN: ABNORMAL
PH UR STRIP.AUTO: 5.5 [PH]
PLATELET # BLD AUTO: 311 X10*3/UL (ref 150–450)
POTASSIUM SERPL-SCNC: 3.2 MMOL/L (ref 3.5–5.3)
PR INTERVAL: 148 MS
PROT SERPL-MCNC: 7.1 G/DL (ref 6.4–8.2)
PROT UR STRIP.AUTO-MCNC: ABNORMAL MG/DL
Q ONSET: 220 MS
QRS COUNT: 17 BEATS
QRS DURATION: 80 MS
QT INTERVAL: 374 MS
QTC CALCULATION(BAZETT): 487 MS
QTC FREDERICIA: 446 MS
R AXIS: 52 DEGREES
RBC # BLD AUTO: 3.71 X10*6/UL (ref 4–5.2)
RBC # UR STRIP.AUTO: ABNORMAL /UL
RBC #/AREA URNS AUTO: NORMAL /HPF
SALICYLATES SERPL-MCNC: <3 MG/DL
SARS-COV-2 RNA RESP QL NAA+PROBE: NOT DETECTED
SODIUM SERPL-SCNC: 141 MMOL/L (ref 136–145)
SP GR UR STRIP.AUTO: 1.02
SQUAMOUS #/AREA URNS AUTO: NORMAL /HPF
T AXIS: 43 DEGREES
T OFFSET: 407 MS
UROBILINOGEN UR STRIP.AUTO-MCNC: NORMAL MG/DL
VENTRICULAR RATE: 102 BPM
WBC # BLD AUTO: 12.7 X10*3/UL (ref 4.4–11.3)
WBC #/AREA URNS AUTO: NORMAL /HPF

## 2024-05-16 PROCEDURE — 36415 COLL VENOUS BLD VENIPUNCTURE: CPT | Performed by: STUDENT IN AN ORGANIZED HEALTH CARE EDUCATION/TRAINING PROGRAM

## 2024-05-16 PROCEDURE — 80143 DRUG ASSAY ACETAMINOPHEN: CPT | Mod: 91 | Performed by: STUDENT IN AN ORGANIZED HEALTH CARE EDUCATION/TRAINING PROGRAM

## 2024-05-16 PROCEDURE — 2500000006 HC RX 250 W HCPCS SELF ADMINISTERED DRUGS (ALT 637 FOR ALL PAYERS): Mod: SE,MUE

## 2024-05-16 PROCEDURE — 99285 EMERGENCY DEPT VISIT HI MDM: CPT | Mod: 25

## 2024-05-16 PROCEDURE — 87635 SARS-COV-2 COVID-19 AMP PRB: CPT

## 2024-05-16 PROCEDURE — 96372 THER/PROPH/DIAG INJ SC/IM: CPT

## 2024-05-16 PROCEDURE — 84075 ASSAY ALKALINE PHOSPHATASE: CPT | Performed by: STUDENT IN AN ORGANIZED HEALTH CARE EDUCATION/TRAINING PROGRAM

## 2024-05-16 PROCEDURE — 81001 URINALYSIS AUTO W/SCOPE: CPT | Mod: CCI

## 2024-05-16 PROCEDURE — 99214 OFFICE O/P EST MOD 30 MIN: CPT | Performed by: PSYCHIATRY & NEUROLOGY

## 2024-05-16 PROCEDURE — 81025 URINE PREGNANCY TEST: CPT | Performed by: STUDENT IN AN ORGANIZED HEALTH CARE EDUCATION/TRAINING PROGRAM

## 2024-05-16 PROCEDURE — 93005 ELECTROCARDIOGRAM TRACING: CPT

## 2024-05-16 PROCEDURE — 93010 ELECTROCARDIOGRAM REPORT: CPT | Performed by: EMERGENCY MEDICINE

## 2024-05-16 PROCEDURE — 80307 DRUG TEST PRSMV CHEM ANLYZR: CPT | Performed by: STUDENT IN AN ORGANIZED HEALTH CARE EDUCATION/TRAINING PROGRAM

## 2024-05-16 PROCEDURE — 85025 COMPLETE CBC W/AUTO DIFF WBC: CPT | Performed by: STUDENT IN AN ORGANIZED HEALTH CARE EDUCATION/TRAINING PROGRAM

## 2024-05-16 PROCEDURE — 2500000004 HC RX 250 GENERAL PHARMACY W/ HCPCS (ALT 636 FOR OP/ED): Mod: JZ,JG,SE

## 2024-05-16 PROCEDURE — 99285 EMERGENCY DEPT VISIT HI MDM: CPT | Performed by: EMERGENCY MEDICINE

## 2024-05-16 RX ORDER — OLANZAPINE 10 MG/2ML
10 INJECTION, POWDER, FOR SOLUTION INTRAMUSCULAR ONCE
Status: COMPLETED | OUTPATIENT
Start: 2024-05-16 | End: 2024-05-16

## 2024-05-16 RX ORDER — OLANZAPINE 5 MG/1
10 TABLET ORAL ONCE
Status: DISCONTINUED | OUTPATIENT
Start: 2024-05-16 | End: 2024-05-16

## 2024-05-16 RX ORDER — MICONAZOLE NITRATE 2 %
2 CREAM (GRAM) TOPICAL AS NEEDED
Status: DISCONTINUED | OUTPATIENT
Start: 2024-05-16 | End: 2024-05-18 | Stop reason: HOSPADM

## 2024-05-16 RX ORDER — OLANZAPINE 10 MG/2ML
INJECTION, POWDER, FOR SOLUTION INTRAMUSCULAR
Status: COMPLETED
Start: 2024-05-16 | End: 2024-05-16

## 2024-05-16 RX ADMIN — OLANZAPINE 10 MG: 10 INJECTION, POWDER, FOR SOLUTION INTRAMUSCULAR at 05:13

## 2024-05-16 RX ADMIN — LUMATEPERONE 42 MG: 42 CAPSULE ORAL at 23:36

## 2024-05-16 ASSESSMENT — COLUMBIA-SUICIDE SEVERITY RATING SCALE - C-SSRS
1. IN THE PAST MONTH, HAVE YOU WISHED YOU WERE DEAD OR WISHED YOU COULD GO TO SLEEP AND NOT WAKE UP?: NO
2. HAVE YOU ACTUALLY HAD ANY THOUGHTS OF KILLING YOURSELF?: NO
6. HAVE YOU EVER DONE ANYTHING, STARTED TO DO ANYTHING, OR PREPARED TO DO ANYTHING TO END YOUR LIFE?: NO

## 2024-05-16 ASSESSMENT — LIFESTYLE VARIABLES
TOTAL SCORE: 0
HAVE PEOPLE ANNOYED YOU BY CRITICIZING YOUR DRINKING: NO
HAVE YOU EVER FELT YOU SHOULD CUT DOWN ON YOUR DRINKING: NO
EVER FELT BAD OR GUILTY ABOUT YOUR DRINKING: NO
EVER HAD A DRINK FIRST THING IN THE MORNING TO STEADY YOUR NERVES TO GET RID OF A HANGOVER: NO

## 2024-05-16 ASSESSMENT — PAIN - FUNCTIONAL ASSESSMENT: PAIN_FUNCTIONAL_ASSESSMENT: 0-10

## 2024-05-16 ASSESSMENT — PAIN SCALES - GENERAL: PAINLEVEL_OUTOF10: 0 - NO PAIN

## 2024-05-16 NOTE — PROGRESS NOTES
I received this patient during signout.  Please see previous provider's note for detailed H&P, labs and imaging.      Under my care, patient reassessed and remains clinically stable. EPAT evaluated patient, please see their full note however, though she's not cooperative with a full exam she does report AH. Denies any SI but also denies that she was walking into traffic last night. She will be an involuntary admission.  Restart her home Caplyta 42 mg at bedtime which I ordered.  UA pending. Patient is medically clear    @  ED Course as of 05/16/24 1039   Thu May 16, 2024   0545 Electrocardiogram, 12-lead  I independently interpreted:  Rate 102 bpm, sinus rhythm, normal axis.  Mildly prolonged QTc interval.  T wave inversions in leads aVR which are normal.  No appreciable ST elevations or depressions.  Impression: Sinus tachycardia with mildly prolonged QTc interval [SHRUTI]   1038 Labs reviewed UA pending however metabolic panel with slight hypokalemia of 3.2, will replete orally.  Acute tox panel with elevated alcohol level.  CBC with slight leukocytosis of 12.7, hemoglobin 11.9, consistent with baseline. [SA]   1039 Patient is medically clear [SA]      ED Course User Index  [SHRUTI] Shamar Abebe DO  [SA] Patricia Kay DO   @    Disposition: EPAT to place      Patient seen and staffed with attending physician.     Patricia Kay DO   EM PGY2

## 2024-05-16 NOTE — SIGNIFICANT EVENT
Application for Emergency Admission      Ready for Transfer?  Is the patient medically cleared for transfer to inpatient psychiatry: Yes  Has the patient been accepted to an inpatient psychiatric hospital: Yes    Application for Emergency Admission  IN ACCORDANCE WITH [place]  The Chief Clinical Officer of: Veronica 5/16/2024 .7:01 PM    Reason for Hospitalization  The undersigned has reason to believe that: Mary Kate Arias Is a mentally ill person subject to hospitalization by court order under division B Section 5122.01 of the Revised Code, i.e., this person:    1.No  Represents a substantial risk of physical harm to self as manifested by evidence of threats of, or attempts at, suicide or serious self-inflicted bodily harm    2.No Represents a substantial risk of physical harm to others as manifested by evidence of recent homicidal or other violent behavior, evidence of recent threats that place another in reasonable fear of violent behavior and serious physical harm, or other evidence of present dangerousness    3.Yes Represents a substantial and immediate risk of serious physical impairment or injury to self as manifested by  evidence that the person is unable to provide for and is not providing for the person's basic physical needs because of the person's mental illness and that appropriate provision for those needs cannot be made  immediately available in the community    4.Yes Would benefit from treatment in a hospital for his mental illness and is in need of such treatment as manifested by evidence of behavior that creates a grave and imminent risk to substantial rights of others or  himself.    5.Yes Would benefit from treatment as manifested by evidence of behavior that indicates all of the following:       (a) The person is unlikely to survive safely in the community without supervision, based on a clinical determination.       (b) The person has a history of lack of compliance with  treatment for mental illness and one of the following applies:      (i) At least twice within the thirty-six months prior to the filing of an affidavit seeking court-ordered treatment of the person under section 5122.111 of the Revised Code, the lack of compliance has been a significant factor in necessitating hospitalization in a hospital or receipt of services in a forensic or other mental health unit of a correctional facility, provided that the thirty-six-month period shall be extended by the length of any hospitalization or incarceration of the person that occurred within the thirty-six-month period.      (ii) Within the forty-eight months prior to the filing of an affidavit seeking court-ordered treatment of the person under section 5122.111 of the Revised Code, the lack of compliance resulted in one or more acts of serious violent behavior toward self or others or threats of, or attempts at, serious physical harm to self or others, provided that the forty-eight-month period shall be extended by the length of any hospitalization or incarceration of the person that occurred within the forty-eight-month period.      (c) The person, as a result of mental illness, is unlikely to voluntarily participate in necessary treatment.       (d) In view of the person's treatment history and current behavior, the person is in need of treatment in order to prevent a relapse or deterioration that would be likely to result in substantial risk of serious harm to the person or others.    (e) Represents a substantial risk of physical harm to self or others if allowed to remain at liberty pending examination.    Therefore, it is requested that said person be admitted to the above named facility.    STATEMENT OF BELIEF    Must be filled out by one of the following: a psychiatrist, licensed physician, licensed clinical psychologist, health or ,  or .  (Statement shall include the circumstances under  which the individual was taken into custody and the reason for the person's belief that hospitalization is necessary. The statement shall also include a reference to efforts made to secure the individual's property at his residence if he was taken into custody there. Every reasonable and appropriate effort should be made to take this person into custody in the least conspicuous manner possible.)    Patient with decompensated psychosis secondary to schizoaffective disorder and bipolar disorder.    Kunal Rothman DO 5/16/2024     _____________________________________________________________   Place of Employment: [facility]     STATEMENT OF OBSERVATION BY PSYCHIATRIST, LICENSED PHYSICIAN, OR LICENSED CLINICAL PSYCHOLOGIST, IF APPLICABLE    Place of Observation (e.g., [facility], general hospital, office, emergency facility)  (If applicable, please complete)    Kunal Rothman DO 5/16/2024    _____________________________________________________________

## 2024-05-16 NOTE — ED TRIAGE NOTES
"Patient arrived via CPD after she was found wandering and walking in and out of the street on [address]. CPD states patient was talking to herself and not making sense. On arrival, patient is asking for \"anti anxiety pills.\" Patient endorses AH/VH. States she hears and sees \"demons.\" Denies SI/HI  "

## 2024-05-16 NOTE — CONSULTS
"Referring Provider  Joel Laird MD    History Of Present Illness  Mary Kate Arias is a 43 y.o. female presenting after she was found wandering in the street by police. She was observed talking to herself and did not make sense. She reported experiencing AVH of demons but denied SI or HI. Requested \"anti anxiety pills\" upon arrival to ED. Ms. Arias received one dose of IM Zyprexa 10 mg at 0513.    On approach, Ms. Arias was in bed with a blanket wrapped around her body. She declined to engage in a conversation with me until I asked if she could hear me. She responded that she could and stated that she did not want to answer many questions. She recalled being brought to the hospital by police but told me that she was not walking in and out of traffic last night. She stated that she was in the street talking to people because she \"knew people,\" but did not elaborate. She denied any SI/HI. She initially denied AH/VH but later admitted to experiencing AH. She declined to answer any further questions about her AH. She admitted to using alcohol and drugs but did not specify further.     Ms. Arias then indicated that she would not answer any further questions. I offered her a voluntary admission which she declined.     Chart review:  Receives mental health services at [facility]. Medications managed by Jourdan Grewal NP. Diagnosed with Schizoaffective Disorder, Bipolar Type. Last received Haldol dec on 4/22/24. Current medications:     Caplyta 42 mg at bedtime  Haldol dec 150 mg [id number]    During her last encounter with Mr. Grewal on 4/11/24, Ms. Arias reported low energy but otherwise stated that she was doing well. She was not experiencing AVH or SI/HI.     Summary of recent admissions:  11/4/23 - 11/8/23 ([facility]) - reported SI, AVH and ongoing use of cannabis, crack cocaine. Diagnosed with Bipolar disorder w/ psychotic features. She was uncooperative and guarded initially, both of which improved " greatly. She was discharged to home on 11/8/23.     Summary of recent ED visits:  12/18/23 ([facility]) -  she reported anxiety and AVH after smoking crack cocaine. She was discharged home.   12/14/23 ([facility]) - anxiety and AVH iso crack use. Given Haldol decanoate injection in ED and discharged home.  11/22/23 ([facility]) - anxiety iso crack use. Discharged home.      Past Medical History  She has no past medical history on file.    Surgical History  She has no past surgical history on file.     Social History  She has no history on file for tobacco use, alcohol use, and drug use.     Allergies  Patient has no known allergies.    Review of Systems    Psychiatric ROS - Adult  Anxiety: Declined to answer.  Depression:  Declined to answer most questions but denied SI.   Delirium: negative  Psychosis: auditory hallucinations  Lindy:  Declined to answer.  Safety Issues:  See HPI.   Psychiatric ROS Comment: Unable to complete full ROS due to lack of cooperation.     Physical Exam      Mental Status Exam  Mental Status Examination  General Appearance:  Laying in bed, wrapped in blanket. Did not make eye contact. Fairly groomed.  Gait/Station:  Not assessed.   Speech: Decreased spontaneity of speech. Short, predominantly one word answers. Volume increased slightly when annoyed by a question.   Mood: Declined to answer.   Affect: Irritable and Angry  Thought Process:  Difficult to assess due to angelica answers. No overt disorganization noted.   Thought Associations: Unable to assess   Thought Content:  Unable to thoroughly assess due to patient irritability and termination of interview.   Perception:  Reports AH but declined to elaborate. Not overtly RTIS.   Level of Consciousness: Alert  Orientation: Alert and oriented to person, place, time and situation  Attention and Concentration:  Not formally assessed. No overt deficits noted.   Recent Memory: Impaired as evidenced by denial of events preceding hospitalization.   "  Remote Memory: Unable to assess   Executive function:  Not formally assessed. No overt deficits noted.   Language: Unable to assess due to termination of interview.   Fund of knowledge: Unable to assess due to termination of interview.   Insight: Limited, as evidenced by unwillingness to discuss history and denial of events prior to hospitalization.   Judgment: Limited, as evidence by inability to reason through medical decision making, recent high-risk or self-harming behavior , and non-compliance with treatment recommendations      Psychiatric Risk Assessment  Violence Risk Assessment: lower socioeconomic class, major mental illness, and substance abuse  Acute Risk of Harm to Others is Considered: low   Suicide Risk Assessment: current psychiatric illness, history of trauma or abuse, substance abuse, and suicidal behaviors  Protective Factors against Suicide: other - no specific protective factors noted due to patient's early termination of interview.   Acute Risk of Harm to Self is Considered: moderate    Last Recorded Vitals  Blood pressure (!) 159/91, pulse (!) 112, temperature 36 °C (96.8 °F), temperature source Oral, resp. rate 18, height 1.753 m (5' 9\"), weight 122 kg (269 lb), SpO2 96%.    Relevant Results    Results for orders placed or performed during the hospital encounter of 05/16/24 (from the past 24 hour(s))   Electrocardiogram, 12-lead   Result Value Ref Range    Ventricular Rate 102 BPM    Atrial Rate 102 BPM    IL Interval 148 ms    QRS Duration 80 ms    QT Interval 374 ms    QTC Calculation(Bazett) 487 ms    P Axis 64 degrees    R Axis 52 degrees    T Axis 43 degrees    QRS Count 17 beats    Q Onset 220 ms    P Onset 146 ms    P Offset 199 ms    T Offset 407 ms    QTC Fredericia 446 ms   CBC and Auto Differential   Result Value Ref Range    WBC 12.7 (H) 4.4 - 11.3 x10*3/uL    nRBC 0.0 0.0 - 0.0 /100 WBCs    RBC 3.71 (L) 4.00 - 5.20 x10*6/uL    Hemoglobin 11.9 (L) 12.0 - 16.0 g/dL    Hematocrit " "33.7 (L) 36.0 - 46.0 %    MCV 91 80 - 100 fL    MCH 32.1 26.0 - 34.0 pg    MCHC 35.3 32.0 - 36.0 g/dL    RDW 14.2 11.5 - 14.5 %    Platelets 311 150 - 450 x10*3/uL    Neutrophils % 67.8 40.0 - 80.0 %    Immature Granulocytes %, Automated 0.6 0.0 - 0.9 %    Lymphocytes % 22.7 13.0 - 44.0 %    Monocytes % 7.9 2.0 - 10.0 %    Eosinophils % 0.4 0.0 - 6.0 %    Basophils % 0.6 0.0 - 2.0 %    Neutrophils Absolute 8.58 (H) 1.20 - 7.70 x10*3/uL    Immature Granulocytes Absolute, Automated 0.07 0.00 - 0.70 x10*3/uL    Lymphocytes Absolute 2.87 1.20 - 4.80 x10*3/uL    Monocytes Absolute 1.00 0.10 - 1.00 x10*3/uL    Eosinophils Absolute 0.05 0.00 - 0.70 x10*3/uL    Basophils Absolute 0.08 0.00 - 0.10 x10*3/uL   Comprehensive Metabolic Panel   Result Value Ref Range    Glucose 120 (H) 74 - 99 mg/dL    Sodium 141 136 - 145 mmol/L    Potassium 3.2 (L) 3.5 - 5.3 mmol/L    Chloride 106 98 - 107 mmol/L    Bicarbonate 20 (L) 21 - 32 mmol/L    Anion Gap 18 10 - 20 mmol/L    Urea Nitrogen 5 (L) 6 - 23 mg/dL    Creatinine 0.60 0.50 - 1.05 mg/dL    eGFR >90 >60 mL/min/1.73m*2    Calcium 9.6 8.6 - 10.6 mg/dL    Albumin 4.0 3.4 - 5.0 g/dL    Alkaline Phosphatase 82 33 - 110 U/L    Total Protein 7.1 6.4 - 8.2 g/dL    AST 47 (H) 9 - 39 U/L    Bilirubin, Total 0.3 0.0 - 1.2 mg/dL    ALT 28 7 - 45 U/L   Acute Toxicology Panel, Blood   Result Value Ref Range    Acetaminophen <10.0 10.0 - 30.0 ug/mL    Salicylate  <3 4 - 20 mg/dL    Alcohol 155 (H) <=10 mg/dL          Assessment/Plan   Active Problems:  There are no active Hospital Problems.    Ms. Arias is a 43-year-old woman with history of Schizoaffective Disorder, Bipolar Type and polysubstance use (alcohol, THC, cocaine) who was brought to  by police after she was observed walking in and out of traffic. She reported hearing and seeing \"demons\" upon arrival to the emergency department. Current assessment is markedly limited by noncooperativity and irritability. Ms. Arias denied the " behaviors that resulted in hospitalization and provided conflicting answers about whether she was experiencing hallucinations. She admitted to using alcohol and drugs but declined to specify further. No overt disorganization or delusions were evident. She declined voluntary admission. Given Ms. Arias' significant history of Schizoaffective Disorder and report that she was walking in and out of traffic, I offered a voluntary admission, which she declined. As Ms. Arias reports ongoing psychotic phenomena and independent observers report highly dangerous behavior, she meets criteria for an involuntary admission.     Recommendations:   Admit involuntarily to first available psychiatric bed.  Maintain constant observation.   Restart home Caplyta (lumateperone) 42 mg at bedtime.  Next Haldol dec 150 mg IM due on 05/20/24.     Please do not hesitate to contact EPAT with any further questions or concerns.      I spent 60 minutes in the professional and overall care of this patient.    Medication Consent  Medication Consent: n/a; consult service    Eliud Cai MD

## 2024-05-17 PROCEDURE — 2500000006 HC RX 250 W HCPCS SELF ADMINISTERED DRUGS (ALT 637 FOR ALL PAYERS): Mod: SE

## 2024-05-17 RX ADMIN — LUMATEPERONE 42 MG: 42 CAPSULE ORAL at 20:47

## 2024-05-17 NOTE — PROGRESS NOTES
I received Mary Kate Arias in signout from Dr. Patricia Kay DO.  Please see the previous note for all HPI, PE and MDM up to the time of signout at 1500.  This is in addition to the primary documentation.    In brief Mary Kate Arias is an 43 y.o. female presenting for schizophrenia and bipolar disorder exacerbation.  Patient was seen by EPAT and are recommending inpatient placement.  Her home Caplyta has been restarted.  The patient is currently awaiting placement at this time.      ED Course as of 05/16/24 2049   Thu May 16, 2024   0545 Electrocardiogram, 12-lead  I independently interpreted:  Rate 102 bpm, sinus rhythm, normal axis.  Mildly prolonged QTc interval.  T wave inversions in leads aVR which are normal.  No appreciable ST elevations or depressions.  Impression: Sinus tachycardia with mildly prolonged QTc interval [SHRUTI]   1038 Labs reviewed UA pending however metabolic panel with slight hypokalemia of 3.2, will replete orally.  Acute tox panel with elevated alcohol level.  CBC with slight leukocytosis of 12.7, hemoglobin 11.9, consistent with baseline. [SA]   1039 Patient is medically clear [SA]   2026 Patient accepted to [facility], pink slipped and EMTALA form completed. [RS]      ED Course User Index  [SHRUTI] Shamar Abebe DO  [RS] Kunal Rothman DO  [SA] Patricia Kay DO       Impression: Schizophrenia and bipolar disorder    Pt Disposition: Accepted to [facility]    Assessment and plan discussed with Dr. Maeve Rothman DO   Emergency Medicine, PGY-1     Disclaimer: This note was dictated by speech recognition. Minor errors in transcription may be present.     Procedures

## 2024-05-17 NOTE — PROGRESS NOTES
Emergency Medicine Transition of Care Note.    I received Mary Kate Arias in signout from Dr. Dai.  Please see the previous ED provider note for all HPI, PE and MDM up to the time of signout at 9 AM. This is in addition to the primary record.    In brief Mary Kate Arias is an 43 y.o. female presenting for   Chief Complaint   Patient presents with    Psychiatric Evaluation     At the time of signout we were awaiting: EPAT placement.  Patient is without complaints at this time.  Clinically stable.  Patient does report continuing auditory hallucination.    ED Course as of 05/17/24 1426   Thu May 16, 2024   0545 Electrocardiogram, 12-lead  I independently interpreted:  Rate 102 bpm, sinus rhythm, normal axis.  Mildly prolonged QTc interval.  T wave inversions in leads aVR which are normal.  No appreciable ST elevations or depressions.  Impression: Sinus tachycardia with mildly prolonged QTc interval [SHRUTI]   1038 Labs reviewed UA pending however metabolic panel with slight hypokalemia of 3.2, will replete orally.  Acute tox panel with elevated alcohol level.  CBC with slight leukocytosis of 12.7, hemoglobin 11.9, consistent with baseline. [SA]   1039 Patient is medically clear [SA]   2026 Patient accepted to [facility], pink slipped and EMTALA form completed. [RS]      ED Course User Index  [SHRUTI] Shamar Abebe DO  [RS] Kunal Rohtman DO  [SA] Patricia Kay DO         Diagnoses as of 05/17/24 1426   Hallucinations       Medical Decision Making      Final diagnoses:   [R44.3] Hallucinations           Procedure  Procedures    Rene Terrazas MD

## 2024-05-17 NOTE — SIGNIFICANT EVENT
Application for Emergency Admission      Ready for Transfer?  Is the patient medically cleared for transfer to inpatient psychiatry: Yes  Has the patient been accepted to an inpatient psychiatric hospital: Yes    Application for Emergency Admission  IN ACCORDANCE WITH [place]10 [place]  The Chief Clinical Officer of: Opal Martin 5/16/2024 .8:40 PM    Reason for Hospitalization  The undersigned has reason to believe that: Mary Kate Arias Is a mentally ill person subject to hospitalization by court order under division B Section 5122.01 of the Revised Code, i.e., this person:    1.No  Represents a substantial risk of physical harm to self as manifested by evidence of threats of, or attempts at, suicide or serious self-inflicted bodily harm    2.No Represents a substantial risk of physical harm to others as manifested by evidence of recent homicidal or other violent behavior, evidence of recent threats that place another in reasonable fear of violent behavior and serious physical harm, or other evidence of present dangerousness    3.Yes Represents a substantial and immediate risk of serious physical impairment or injury to self as manifested by  evidence that the person is unable to provide for and is not providing for the person's basic physical needs because of the person's mental illness and that appropriate provision for those needs cannot be made  immediately available in the community    4.Yes Would benefit from treatment in a hospital for his mental illness and is in need of such treatment as manifested by evidence of behavior that creates a grave and imminent risk to substantial rights of others or  himself.    5.Yes Would benefit from treatment as manifested by evidence of behavior that indicates all of the following:       (a) The person is unlikely to survive safely in the community without supervision, based on a clinical determination.       (b) The person has a history of lack of compliance  with treatment for mental illness and one of the following applies:      (i) At least twice within the thirty-six months prior to the filing of an affidavit seeking court-ordered treatment of the person under section 5122.111 of the Revised Code, the lack of compliance has been a significant factor in necessitating hospitalization in a hospital or receipt of services in a forensic or other mental health unit of a correctional facility, provided that the thirty-six-month period shall be extended by the length of any hospitalization or incarceration of the person that occurred within the thirty-six-month period.      (ii) Within the forty-eight months prior to the filing of an affidavit seeking court-ordered treatment of the person under section 5122.111 of the Revised Code, the lack of compliance resulted in one or more acts of serious violent behavior toward self or others or threats of, or attempts at, serious physical harm to self or others, provided that the forty-eight-month period shall be extended by the length of any hospitalization or incarceration of the person that occurred within the forty-eight-month period.      (c) The person, as a result of mental illness, is unlikely to voluntarily participate in necessary treatment.       (d) In view of the person's treatment history and current behavior, the person is in need of treatment in order to prevent a relapse or deterioration that would be likely to result in substantial risk of serious harm to the person or others.    (e) Represents a substantial risk of physical harm to self or others if allowed to remain at liberty pending examination.    Therefore, it is requested that said person be admitted to the above named facility.    STATEMENT OF BELIEF    Must be filled out by one of the following: a psychiatrist, licensed physician, licensed clinical psychologist, health or ,  or .  (Statement shall include the circumstances  under which the individual was taken into custody and the reason for the person's belief that hospitalization is necessary. The statement shall also include a reference to efforts made to secure the individual's property at his residence if he was taken into custody there. Every reasonable and appropriate effort should be made to take this person into custody in the least conspicuous manner possible.)    Decompensated psychosis secondary to schizoaffective disorder and bipolar disorder     Maeve Hearn DO 5/16/2024     _____________________________________________________________   Place of Employment: [facility]    STATEMENT OF OBSERVATION BY PSYCHIATRIST, LICENSED PHYSICIAN, OR LICENSED CLINICAL PSYCHOLOGIST, IF APPLICABLE    Place of Observation (e.g., [facility] center, general hospital, office, emergency facility)  (If applicable, please complete)    Maeve Hearn DO 5/16/2024    _____________________________________________________________

## 2024-05-18 VITALS
SYSTOLIC BLOOD PRESSURE: 127 MMHG | OXYGEN SATURATION: 98 % | DIASTOLIC BLOOD PRESSURE: 83 MMHG | RESPIRATION RATE: 18 BRPM | HEIGHT: 69 IN | TEMPERATURE: 97.8 F | HEART RATE: 68 BPM | BODY MASS INDEX: 39.84 KG/M2 | WEIGHT: 269 LBS

## 2024-05-18 LAB — CK SERPL-CCNC: 100 U/L (ref 0–215)

## 2024-05-18 PROCEDURE — 82550 ASSAY OF CK (CPK): CPT | Performed by: EMERGENCY MEDICINE

## 2024-05-18 PROCEDURE — 36415 COLL VENOUS BLD VENIPUNCTURE: CPT | Performed by: EMERGENCY MEDICINE

## 2024-05-18 SDOH — HEALTH STABILITY: MENTAL HEALTH: SUICIDE ASSESSMENT: ADULT (C-SSRS)

## 2024-05-18 SDOH — HEALTH STABILITY: MENTAL HEALTH: HAVE YOU ACTUALLY HAD ANY THOUGHTS OF KILLING YOURSELF?: NO

## 2024-05-18 SDOH — HEALTH STABILITY: MENTAL HEALTH: HAVE YOU WISHED YOU WERE DEAD OR WISHED YOU COULD GO TO SLEEP AND NOT WAKE UP?: NO

## 2024-05-18 SDOH — HEALTH STABILITY: MENTAL HEALTH: HAVE YOU EVER DONE ANYTHING, STARTED TO DO ANYTHING, OR PREPARED TO DO ANYTHING TO END YOUR LIFE?: NO

## 2024-05-18 ASSESSMENT — COLUMBIA-SUICIDE SEVERITY RATING SCALE - C-SSRS
1. SINCE LAST CONTACT, HAVE YOU WISHED YOU WERE DEAD OR WISHED YOU COULD GO TO SLEEP AND NOT WAKE UP?: NO
2. HAVE YOU ACTUALLY HAD ANY THOUGHTS OF KILLING YOURSELF?: NO
6. HAVE YOU EVER DONE ANYTHING, STARTED TO DO ANYTHING, OR PREPARED TO DO ANYTHING TO END YOUR LIFE?: NO

## 2024-05-18 ASSESSMENT — PAIN SCALES - GENERAL
PAINLEVEL_OUTOF10: 0 - NO PAIN
PAINLEVEL_OUTOF10: 0 - NO PAIN

## 2024-05-18 NOTE — ED PROVIDER NOTES
HPI   Chief Complaint   Patient presents with    Psychiatric Evaluation       HPI  43-year-old female with history of schizoaffective disorder bipolar type who presents for psychiatric evaluation.  Patient was found by police wandering the street, talking to herself, appeared disorganized.  On arrival, patient requesting antianxiety medicine.                  [place] Coma Scale Score: 15                     Patient History   History reviewed. No pertinent past medical history.  History reviewed. No pertinent surgical history.  No family history on file.  Social History     Tobacco Use    Smoking status: Not on file    Smokeless tobacco: Not on file   Substance Use Topics    Alcohol use: Not on file    Drug use: Not on file       Physical Exam   ED Triage Vitals [05/16/24 0447]   Temperature Heart Rate Respirations BP   36 °C (96.8 °F) (!) 112 18 (!) 159/91      Pulse Ox Temp Source Heart Rate Source Patient Position   96 % Oral Monitor Lying      BP Location FiO2 (%)     Left arm --       Physical Exam  Vitals and nursing note reviewed.   Constitutional:       Appearance: Normal appearance.   HENT:      Head: Normocephalic.      Mouth/Throat:      Mouth: Mucous membranes are moist.   Eyes:      Conjunctiva/sclera: Conjunctivae normal.   Cardiovascular:      Rate and Rhythm: Normal rate.   Pulmonary:      Effort: Pulmonary effort is normal.   Abdominal:      General: Abdomen is flat.   Neurological:      Mental Status: She is alert.      Comments: Alert, no facial droop, no dysarthria, extraocular motion intact, moving all extremities with 5-5 symmetric strength   Psychiatric:         Attention and Perception: She perceives auditory hallucinations.         Mood and Affect: Mood is anxious. Affect is labile.         Speech: Speech is rapid and pressured and tangential.         Behavior: Behavior is hyperactive.         Thought Content: Thought content is paranoid.         ED Course & MDM   ED Course as of 05/18/24  0950   Thu May 16, 2024   0545 Electrocardiogram, 12-lead  I independently interpreted:  Rate 102 bpm, sinus rhythm, normal axis.  Mildly prolonged QTc interval.  T wave inversions in leads aVR which are normal.  No appreciable ST elevations or depressions.  Impression: Sinus tachycardia with mildly prolonged QTc interval [SHRUTI]   1038 Labs reviewed UA pending however metabolic panel with slight hypokalemia of 3.2, will replete orally.  Acute tox panel with elevated alcohol level.  CBC with slight leukocytosis of 12.7, hemoglobin 11.9, consistent with baseline. [SA]   1039 Patient is medically clear [SA]   2026 Patient accepted to [facility], pink slipped and EMTALA form completed. [RS]      ED Course User Index  [SHRUTI] Shamar Abebe DO  [RS] Kunal Rothman DO  [SA] Patricia Kay DO         Diagnoses as of 05/18/24 0950   Hallucinations       Medical Decision Making  43-year-old female with history of schizoaffective disorder bipolar type who presents for psychiatric evaluation after she was found wandering the street by police, talking to herself.  On exam, patient is anxious, labile, requesting antianxiety medications.  She does endorse auditory hallucinations and appears internally stimulated, disorganized.  We did offer by mouth anxiolytics however patient requested intramuscular for which olanzapine administered IM.  Basic medical and toxicologic workup initiated along with request for EPAT consultation.  I do anticipate inpatient psychiatric hospitalization.  Patient was signed out in stable condition awaiting formal EPAT recommendations.    Procedure  Procedures     Shamar Abebe DO  Resident  05/18/24 1000

## 2024-05-18 NOTE — SIGNIFICANT EVENT
Application for Emergency Admission      Ready for Transfer?  Is the patient medically cleared for transfer to inpatient psychiatry: Yes  Has the patient been accepted to an inpatient psychiatric hospital: Yes    Application for Emergency Admission  IN ACCORDANCE WITH [place]122[place]  The Chief Clinical Officer of: [facility] 5/18/2024 .4:53 AM    Reason for Hospitalization  The undersigned has reason to believe that: Mary Kate Arias Is a mentally ill person subject to hospitalization by court order under division B Section 5122.01 of [place] Revised Code, i.e., this person:    1.No  Represents a substantial risk of physical harm to self as manifested by evidence of threats of, or attempts at, suicide or serious self-inflicted bodily harm    2.No Represents a substantial risk of physical harm to others as manifested by evidence of recent homicidal or other violent behavior, evidence of recent threats that place another in reasonable fear of violent behavior and serious physical harm, or other evidence of present dangerousness    3.Yes Represents a substantial and immediate risk of serious physical impairment or injury to self as manifested by  evidence that the person is unable to provide for and is not providing for the person's basic physical needs because of the person's mental illness and that appropriate provision for those needs cannot be made  immediately available in the community    4.Yes Would benefit from treatment in a hospital for his mental illness and is in need of such treatment as manifested by evidence of behavior that creates a grave and imminent risk to substantial rights of others or  himself.    5.Yes Would benefit from treatment as manifested by evidence of behavior that indicates all of the following:       (a) The person is unlikely to survive safely in the community without supervision, based on a clinical determination.       (b) The person has a history of lack of compliance  with treatment for mental illness and one of the following applies:      (i) At least twice within the thirty-six months prior to the filing of an affidavit seeking court-ordered treatment of the person under section 5122.111 of the Revised Code, the lack of compliance has been a significant factor in necessitating hospitalization in a hospital or receipt of services in a forensic or other mental health unit of a correctional facility, provided that the thirty-six-month period shall be extended by the length of any hospitalization or incarceration of the person that occurred within the thirty-six-month period.      (ii) Within the forty-eight months prior to the filing of an affidavit seeking court-ordered treatment of the person under section 5122.111 of the Revised Code, the lack of compliance resulted in one or more acts of serious violent behavior toward self or others or threats of, or attempts at, serious physical harm to self or others, provided that the forty-eight-month period shall be extended by the length of any hospitalization or incarceration of the person that occurred within the forty-eight-month period.      (c) The person, as a result of mental illness, is unlikely to voluntarily participate in necessary treatment.       (d) In view of the person's treatment history and current behavior, the person is in need of treatment in order to prevent a relapse or deterioration that would be likely to result in substantial risk of serious harm to the person or others.    (e) Represents a substantial risk of physical harm to self or others if allowed to remain at liberty pending examination.    Therefore, it is requested that said person be admitted to the above named facility.    STATEMENT OF BELIEF    Must be filled out by one of the following: a psychiatrist, licensed physician, licensed clinical psychologist, health or ,  or .  (Statement shall include the circumstances  under which the individual was taken into custody and the reason for the person's belief that hospitalization is necessary. The statement shall also include a reference to efforts made to secure the individual's property at his residence if he was taken into custody there. Every reasonable and appropriate effort should be made to take this person into custody in the least conspicuous manner possible.)    Decompensated psychosis secondary to schizoaffective disorder and bipolar disorder     Joe Rodriguez DO 5/18/2024     _____________________________________________________________   Place of Employment: [facility]    STATEMENT OF OBSERVATION BY PSYCHIATRIST, LICENSED PHYSICIAN, OR LICENSED CLINICAL PSYCHOLOGIST, IF APPLICABLE    Place of Observation (e.g., [facility] center, general hospital, office, emergency facility)  (If applicable, please complete)    Joe Rodriguez DO 5/18/2024    _____________________________________________________________

## 2024-05-30 ENCOUNTER — HOSPITAL ENCOUNTER (EMERGENCY)
Facility: HOSPITAL | Age: 44
Discharge: HOME | End: 2024-05-30
Payer: COMMERCIAL

## 2024-05-30 VITALS
OXYGEN SATURATION: 97 % | RESPIRATION RATE: 16 BRPM | DIASTOLIC BLOOD PRESSURE: 81 MMHG | SYSTOLIC BLOOD PRESSURE: 137 MMHG | TEMPERATURE: 97.9 F | HEART RATE: 86 BPM

## 2024-05-30 PROCEDURE — 4500999001 HC ED NO CHARGE

## 2024-05-30 NOTE — ED TRIAGE NOTES
Patient to ED with EMS originally for anxiety. Patient reports she has thoughts of hurting staff and herself. No plan. Just keeps requesting to go to sleep. PMH schizoaffective Bipolar type.